# Patient Record
Sex: FEMALE | Race: BLACK OR AFRICAN AMERICAN | Employment: FULL TIME | URBAN - METROPOLITAN AREA
[De-identification: names, ages, dates, MRNs, and addresses within clinical notes are randomized per-mention and may not be internally consistent; named-entity substitution may affect disease eponyms.]

---

## 2018-06-06 LAB
HIV, EXTERNAL: NON REACTIVE
TYPE, ABO & RH, EXTERNAL: NORMAL

## 2018-07-31 LAB
CHLAMYDIA, EXTERNAL: NEGATIVE
HBSAG, EXTERNAL: NEGATIVE
N. GONORRHEA, EXTERNAL: NEGATIVE
RUBELLA, EXTERNAL: NORMAL
T. PALLIDUM, EXTERNAL: NEGATIVE

## 2018-11-13 ENCOUNTER — HOSPITAL ENCOUNTER (EMERGENCY)
Age: 38
Discharge: HOME | End: 2018-11-13
Attending: OBSTETRICS & GYNECOLOGY | Admitting: OBSTETRICS & GYNECOLOGY
Payer: COMMERCIAL

## 2018-11-13 VITALS
WEIGHT: 140 LBS | HEART RATE: 67 BPM | SYSTOLIC BLOOD PRESSURE: 105 MMHG | RESPIRATION RATE: 14 BRPM | DIASTOLIC BLOOD PRESSURE: 66 MMHG | TEMPERATURE: 99.4 F

## 2018-11-13 PROCEDURE — 99282 EMERGENCY DEPT VISIT SF MDM: CPT

## 2018-11-13 RX ORDER — LEVOTHYROXINE SODIUM 125 UG/1
125 TABLET ORAL
COMMUNITY
End: 2019-02-19

## 2018-11-13 NOTE — DISCHARGE INSTRUCTIONS
Pregnancy Precautions: Care Instructions  Your Care Instructions    There is no sure way to prevent labor before your due date ( labor) or to prevent most other pregnancy problems. But there are things you can do to increase your chances of a healthy pregnancy. Go to your appointments, follow your doctor's advice, and take good care of yourself. Eat well, and exercise (if your doctor agrees). And make sure to drink plenty of water. Follow-up care is a key part of your treatment and safety. Be sure to make and go to all appointments, and call your doctor if you are having problems. It's also a good idea to know your test results and keep a list of the medicines you take. How can you care for yourself at home? · Make sure you go to your prenatal appointments. At each visit, your doctor will check your blood pressure. Your doctor will also check to see if you have protein in your urine. High blood pressure and protein in urine are signs of preeclampsia. This condition can be dangerous for you and your baby. · Drink plenty of fluids, enough so that your urine is light yellow or clear like water. Dehydration can cause contractions. If you have kidney, heart, or liver disease and have to limit fluids, talk with your doctor before you increase the amount of fluids you drink. · Tell your doctor right away if you notice any symptoms of an infection, such as:  ? Burning when you urinate. ? A foul-smelling discharge from your vagina. ? Vaginal itching. ? Unexplained fever. ? Unusual pain or soreness in your uterus or lower belly. · Eat a balanced diet. Include plenty of foods that are high in calcium and iron. ? Foods high in calcium include milk, cheese, yogurt, almonds, and broccoli. ? Foods high in iron include red meat, shellfish, poultry, eggs, beans, raisins, whole-grain bread, and leafy green vegetables. · Do not smoke.  If you need help quitting, talk to your doctor about stop-smoking programs and medicines. These can increase your chances of quitting for good. · Do not drink alcohol or use illegal drugs. · Follow your doctor's directions about activity. Your doctor will let you know how much, if any, exercise you can do. · Ask your doctor if you can have sex. If you are at risk for early labor, your doctor may ask you to not have sex. · Take care to prevent falls. During pregnancy, your joints are loose, and your balance is off. Sports such as bicycling, skiing, or in-line skating can increase your risk of falling. And don't ride horses or motorcycles, dive, water ski, scuba dive, or parachute jump while you are pregnant. · Avoid getting very hot. Do not use saunas or hot tubs. Avoid staying out in the sun in hot weather for long periods. Take acetaminophen (Tylenol) to lower a high fever. · Do not take any over-the-counter or herbal medicines or supplements without talking to your doctor or pharmacist first.  When should you call for help? Call 911 anytime you think you may need emergency care. For example, call if:    · You passed out (lost consciousness).     · You have severe vaginal bleeding.     · You have severe pain in your belly or pelvis.     · You have had fluid gushing or leaking from your vagina and you know or think the umbilical cord is bulging into your vagina. If this happens, immediately get down on your knees so your rear end (buttocks) is higher than your head. This will decrease the pressure on the cord until help arrives.   [facility] your doctor now or seek immediate medical care if:    · You have signs of preeclampsia, such as:  ? Sudden swelling of your face, hands, or feet. ? New vision problems (such as dimness or blurring). ? A severe headache.     · You have any vaginal bleeding.     · You have belly pain or cramping.     · You have a fever.     · You have had regular contractions (with or without pain) for an hour.  This means that you have 8 or more within 1 hour or 4 or more in 20 minutes after you change your position and drink fluids.     · You have a sudden release of fluid from your vagina.     · You have low back pain or pelvic pressure that does not go away.     · You notice that your baby has stopped moving or is moving much less than normal.    Watch closely for changes in your health, and be sure to contact your doctor if you have any problems. Where can you learn more? Go to http://danna-ambreen.info/. Enter [id number] in the search box to learn more about \"Pregnancy Precautions: Care Instructions. \"  Current as of: November 21, 2017  Content Version: 11.8  © 2845-2886 [employer]. Care instructions adapted under license by [employer] (which disclaims liability or warranty for this information). If you have questions about a medical condition or this instruction, always ask your healthcare professional. [address] any warranty or liability for your use of this information.

## 2018-11-13 NOTE — H&P
Obstetrics Admission History & Physical 
 
Name: Kahlil Jeffery MRN: [id number]  SSN: [id number] YOB: 1980  Age: 41 y.o. Sex: female Subjective:  
 
Reason for Admission:  Pregnancy and syncope History of Present Illness: Kahlil Jeffery is a 41 y.o.  female with an estimated gestational age of Unknown with Estimated Date of Delivery: None noted. . Patient complains of becoming overheated, lightheaded and then fainting in a coworkers arms at work today. No abdominal trauma, no real loss of consciousness, roused quickly. They called EMS but at that time she was feeling better so she elected to go home to rest.  At home she again began feeling hot and dizzy so presented to the hospital.  Upon arrival to L&D she notes feeling much better. No residual lightheaded/dizzy/faintness. She reports poor PO hydration today but says she's had enough to eat, although her only current complaint is hunger and thirst. She is feeling good FM. Denies LOF, VB and contractions. Pregnancy has been complicated by advanced maternal age with normal tetra and fetal scan. She also has hypothyroidism which has been controlled on synthroid. OB history: [id number] Term  x 2 and EAB x 2 No past medical history on file. No past surgical history on file. Social History Socioeconomic History  Marital status: SINGLE Spouse name: Not on file  Number of children: Not on file  Years of education: Not on file  Highest education level: Not on file Social Needs  Financial resource strain: Not on file  Food insecurity - worry: Not on file  Food insecurity - inability: Not on file  Transportation needs - medical: Not on file  Transportation needs - non-medical: Not on file Occupational History  Not on file Tobacco Use  Smoking status: Not on file Substance and Sexual Activity  Alcohol use: Not on file  Drug use: Not on file  Sexual activity: Not on file Other Topics Concern  Not on file Social History Narrative  Not on file No family history on file. Allergies not on file Prior to Admission medications Not on File Review of Systems: A comprehensive review of systems was negative except for that written in the History of Present Illness. Objective:  
 
Vitals: There were no vitals taken for this visit. No data recorded. No Data Recorded Physical Exam: 
Patient without distress. Heart: Regular rate and rhythm Lung: normal respiratory effort Fundus: soft and non tender Lower Extremities:  - Edema No    
 
Membranes:  Intact Uterine Activity:  None Fetal Heart Rate:  Baseline: 145 per minute Variability: moderate No accels, no decels Uterine contractions: none Labs: No results found for this or any previous visit (from the past 24 hour(s)). Assessment and Plan: S/p syncope - suspect related to poor PO intake/hydration today, now feeling much better, reassuring FHTs 
- PO hydrate, snack - pt reassured 
- has appt in 2 weeks for thyroid function and 28wk labs  - advised ok to wait until then for bloodwork as long as she is doing well in the interim, if persistent syncope/near syncope then would need to do CBC and TFTs sooner, pt in agreement with plan Signed By:  Ulysses Stock MD   
 November 13, 2018

## 2019-01-22 LAB — GRBS, EXTERNAL: NEGATIVE

## 2019-02-17 ENCOUNTER — HOSPITAL ENCOUNTER (INPATIENT)
Age: 39
LOS: 2 days | Discharge: HOME | End: 2019-02-19
Attending: OBSTETRICS & GYNECOLOGY | Admitting: ADVANCED PRACTICE MIDWIFE
Payer: SELF-PAY

## 2019-02-17 ENCOUNTER — ANESTHESIA EVENT (OUTPATIENT)
Dept: LABOR AND DELIVERY | Age: 39
End: 2019-02-17
Payer: SELF-PAY

## 2019-02-17 ENCOUNTER — ANESTHESIA (OUTPATIENT)
Dept: LABOR AND DELIVERY | Age: 39
End: 2019-02-17
Payer: SELF-PAY

## 2019-02-17 PROBLEM — Z34.90 PREGNANCY: Status: ACTIVE | Noted: 2019-02-17

## 2019-02-17 LAB
BASOPHILS # BLD: 0 K/UL (ref 0–0.1)
BASOPHILS NFR BLD: 0 % (ref 0–1)
DIFFERENTIAL METHOD BLD: ABNORMAL
EOSINOPHIL # BLD: 0.1 K/UL (ref 0–0.4)
EOSINOPHIL NFR BLD: 1 % (ref 0–7)
ERYTHROCYTE [DISTWIDTH] IN BLOOD BY AUTOMATED COUNT: 17.5 % (ref 11.5–14.5)
HCT VFR BLD AUTO: 28.4 % (ref 35–47)
HGB BLD-MCNC: 8.7 G/DL (ref 11.5–16)
IMM GRANULOCYTES # BLD AUTO: 0.1 K/UL (ref 0–0.04)
IMM GRANULOCYTES NFR BLD AUTO: 1 % (ref 0–0.5)
LYMPHOCYTES # BLD: 2.2 K/UL (ref 0.8–3.5)
LYMPHOCYTES NFR BLD: 19 % (ref 12–49)
MCH RBC QN AUTO: 22.7 PG (ref 26–34)
MCHC RBC AUTO-ENTMCNC: 30.6 G/DL (ref 30–36.5)
MCV RBC AUTO: 74 FL (ref 80–99)
MONOCYTES # BLD: 1.4 K/UL (ref 0–1)
MONOCYTES NFR BLD: 12 % (ref 5–13)
NEUTS SEG # BLD: 7.7 K/UL (ref 1.8–8)
NEUTS SEG NFR BLD: 67 % (ref 32–75)
NRBC # BLD: 0.25 K/UL (ref 0–0.01)
NRBC BLD-RTO: 2.2 PER 100 WBC
PLATELET # BLD AUTO: 233 K/UL (ref 150–400)
PMV BLD AUTO: 11.8 FL (ref 8.9–12.9)
RBC # BLD AUTO: 3.84 M/UL (ref 3.8–5.2)
WBC # BLD AUTO: 11.4 K/UL (ref 3.6–11)

## 2019-02-17 PROCEDURE — 74011250637 HC RX REV CODE- 250/637: Performed by: ADVANCED PRACTICE MIDWIFE

## 2019-02-17 PROCEDURE — 65410000002 HC RM PRIVATE OB

## 2019-02-17 PROCEDURE — 74011250636 HC RX REV CODE- 250/636: Performed by: ANESTHESIOLOGY

## 2019-02-17 PROCEDURE — A4300 CATH IMPL VASC ACCESS PORTAL: HCPCS

## 2019-02-17 PROCEDURE — 36415 COLL VENOUS BLD VENIPUNCTURE: CPT

## 2019-02-17 PROCEDURE — 65270000029 HC RM PRIVATE

## 2019-02-17 PROCEDURE — 74011000250 HC RX REV CODE- 250

## 2019-02-17 PROCEDURE — 75410000002 HC LABOR FEE PER 1 HR

## 2019-02-17 PROCEDURE — 74011000250 HC RX REV CODE- 250: Performed by: ANESTHESIOLOGY

## 2019-02-17 PROCEDURE — 74011250636 HC RX REV CODE- 250/636: Performed by: ADVANCED PRACTICE MIDWIFE

## 2019-02-17 PROCEDURE — 85025 COMPLETE CBC W/AUTO DIFF WBC: CPT

## 2019-02-17 PROCEDURE — 76060000078 HC EPIDURAL ANESTHESIA

## 2019-02-17 PROCEDURE — 75410000000 HC DELIVERY VAGINAL/SINGLE

## 2019-02-17 PROCEDURE — 00HU33Z INSERTION OF INFUSION DEVICE INTO SPINAL CANAL, PERCUTANEOUS APPROACH: ICD-10-PCS | Performed by: ANESTHESIOLOGY

## 2019-02-17 PROCEDURE — 77030011943

## 2019-02-17 RX ORDER — SWAB
1 SWAB, NON-MEDICATED MISCELLANEOUS DAILY
Status: DISCONTINUED | OUTPATIENT
Start: 2019-02-18 | End: 2019-02-19 | Stop reason: HOSPADM

## 2019-02-17 RX ORDER — BUPIVACAINE HYDROCHLORIDE 2.5 MG/ML
INJECTION, SOLUTION EPIDURAL; INFILTRATION; INTRACAUDAL AS NEEDED
Status: DISCONTINUED | OUTPATIENT
Start: 2019-02-17 | End: 2019-02-17 | Stop reason: HOSPADM

## 2019-02-17 RX ORDER — FENTANYL CITRATE 50 UG/ML
100 INJECTION, SOLUTION INTRAMUSCULAR; INTRAVENOUS ONCE
Status: DISCONTINUED | OUTPATIENT
Start: 2019-02-17 | End: 2019-02-18 | Stop reason: HOSPADM

## 2019-02-17 RX ORDER — SIMETHICONE 80 MG
80 TABLET,CHEWABLE ORAL
Status: DISCONTINUED | OUTPATIENT
Start: 2019-02-17 | End: 2019-02-19 | Stop reason: HOSPADM

## 2019-02-17 RX ORDER — OXYTOCIN/RINGER'S LACTATE 20/1000 ML
125-500 PLASTIC BAG, INJECTION (ML) INTRAVENOUS ONCE
Status: ACTIVE | OUTPATIENT
Start: 2019-02-18 | End: 2019-02-18

## 2019-02-17 RX ORDER — NALBUPHINE HYDROCHLORIDE 10 MG/ML
2.5 INJECTION, SOLUTION INTRAMUSCULAR; INTRAVENOUS; SUBCUTANEOUS
Status: ACTIVE | OUTPATIENT
Start: 2019-02-17 | End: 2019-02-17

## 2019-02-17 RX ORDER — FENTANYL/BUPIVACAINE/NS/PF 2-1250MCG
1-16 PREFILLED PUMP RESERVOIR EPIDURAL CONTINUOUS
Status: DISCONTINUED | OUTPATIENT
Start: 2019-02-17 | End: 2019-02-19 | Stop reason: HOSPADM

## 2019-02-17 RX ORDER — NALOXONE HYDROCHLORIDE 0.4 MG/ML
0.4 INJECTION, SOLUTION INTRAMUSCULAR; INTRAVENOUS; SUBCUTANEOUS AS NEEDED
Status: DISCONTINUED | OUTPATIENT
Start: 2019-02-17 | End: 2019-02-18 | Stop reason: HOSPADM

## 2019-02-17 RX ORDER — DIPHENHYDRAMINE HYDROCHLORIDE 50 MG/ML
INJECTION, SOLUTION INTRAMUSCULAR; INTRAVENOUS
Status: DISPENSED
Start: 2019-02-17 | End: 2019-02-18

## 2019-02-17 RX ORDER — FENTANYL CITRATE 50 UG/ML
INJECTION, SOLUTION INTRAMUSCULAR; INTRAVENOUS AS NEEDED
Status: DISCONTINUED | OUTPATIENT
Start: 2019-02-17 | End: 2019-02-17 | Stop reason: HOSPADM

## 2019-02-17 RX ORDER — BUPIVACAINE HYDROCHLORIDE 2.5 MG/ML
15 INJECTION, SOLUTION EPIDURAL; INFILTRATION; INTRACAUDAL ONCE
Status: ACTIVE | OUTPATIENT
Start: 2019-02-17 | End: 2019-02-18

## 2019-02-17 RX ORDER — SODIUM CHLORIDE 0.9 % (FLUSH) 0.9 %
5-40 SYRINGE (ML) INJECTION AS NEEDED
Status: DISCONTINUED | OUTPATIENT
Start: 2019-02-17 | End: 2019-02-18 | Stop reason: HOSPADM

## 2019-02-17 RX ORDER — SODIUM CHLORIDE, SODIUM LACTATE, POTASSIUM CHLORIDE, CALCIUM CHLORIDE 600; 310; 30; 20 MG/100ML; MG/100ML; MG/100ML; MG/100ML
125 INJECTION, SOLUTION INTRAVENOUS CONTINUOUS
Status: DISCONTINUED | OUTPATIENT
Start: 2019-02-17 | End: 2019-02-18 | Stop reason: HOSPADM

## 2019-02-17 RX ORDER — DIPHENHYDRAMINE HYDROCHLORIDE 50 MG/ML
25 INJECTION, SOLUTION INTRAMUSCULAR; INTRAVENOUS ONCE
Status: COMPLETED | OUTPATIENT
Start: 2019-02-17 | End: 2019-02-17

## 2019-02-17 RX ORDER — HYDROCODONE BITARTRATE AND ACETAMINOPHEN 5; 325 MG/1; MG/1
1 TABLET ORAL
Status: DISCONTINUED | OUTPATIENT
Start: 2019-02-17 | End: 2019-02-19 | Stop reason: HOSPADM

## 2019-02-17 RX ORDER — LANOLIN ALCOHOL/MO/W.PET/CERES
1 CREAM (GRAM) TOPICAL
Status: DISCONTINUED | OUTPATIENT
Start: 2019-02-18 | End: 2019-02-19 | Stop reason: HOSPADM

## 2019-02-17 RX ORDER — MAG HYDROX/ALUMINUM HYD/SIMETH 200-200-20
30 SUSPENSION, ORAL (FINAL DOSE FORM) ORAL
Status: DISCONTINUED | OUTPATIENT
Start: 2019-02-17 | End: 2019-02-18 | Stop reason: HOSPADM

## 2019-02-17 RX ORDER — IBUPROFEN 400 MG/1
800 TABLET ORAL EVERY 8 HOURS
Status: DISCONTINUED | OUTPATIENT
Start: 2019-02-18 | End: 2019-02-19 | Stop reason: HOSPADM

## 2019-02-17 RX ORDER — EPHEDRINE SULFATE 50 MG/ML
INJECTION, SOLUTION INTRAVENOUS
Status: COMPLETED
Start: 2019-02-17 | End: 2019-02-17

## 2019-02-17 RX ORDER — SODIUM CHLORIDE 0.9 % (FLUSH) 0.9 %
5-40 SYRINGE (ML) INJECTION EVERY 8 HOURS
Status: DISCONTINUED | OUTPATIENT
Start: 2019-02-17 | End: 2019-02-18 | Stop reason: HOSPADM

## 2019-02-17 RX ORDER — ACETAMINOPHEN 325 MG/1
650 TABLET ORAL
Status: DISCONTINUED | OUTPATIENT
Start: 2019-02-17 | End: 2019-02-19 | Stop reason: HOSPADM

## 2019-02-17 RX ORDER — BUPIVACAINE HYDROCHLORIDE 2.5 MG/ML
15 INJECTION, SOLUTION EPIDURAL; INFILTRATION; INTRACAUDAL ONCE
Status: DISPENSED | OUTPATIENT
Start: 2019-02-17 | End: 2019-02-18

## 2019-02-17 RX ORDER — EPHEDRINE SULFATE 50 MG/ML
12.5 INJECTION, SOLUTION INTRAVENOUS ONCE
Status: COMPLETED | OUTPATIENT
Start: 2019-02-17 | End: 2019-02-17

## 2019-02-17 RX ORDER — OXYTOCIN/0.9 % SODIUM CHLORIDE 30/500 ML
0-25 PLASTIC BAG, INJECTION (ML) INTRAVENOUS
Status: DISCONTINUED | OUTPATIENT
Start: 2019-02-17 | End: 2019-02-18 | Stop reason: HOSPADM

## 2019-02-17 RX ORDER — MISOPROSTOL 200 UG/1
TABLET ORAL
Status: DISCONTINUED
Start: 2019-02-17 | End: 2019-02-18 | Stop reason: WASHOUT

## 2019-02-17 RX ORDER — HYDROCORTISONE ACETATE PRAMOXINE HCL 2.5; 1 G/100G; G/100G
CREAM TOPICAL AS NEEDED
Status: DISCONTINUED | OUTPATIENT
Start: 2019-02-17 | End: 2019-02-19 | Stop reason: HOSPADM

## 2019-02-17 RX ORDER — NALBUPHINE HYDROCHLORIDE 10 MG/ML
2.5 INJECTION, SOLUTION INTRAMUSCULAR; INTRAVENOUS; SUBCUTANEOUS
Status: DISCONTINUED | OUTPATIENT
Start: 2019-02-17 | End: 2019-02-18 | Stop reason: HOSPADM

## 2019-02-17 RX ORDER — LEVOTHYROXINE SODIUM 150 UG/1
150 TABLET ORAL
Status: DISCONTINUED | OUTPATIENT
Start: 2019-02-17 | End: 2019-02-19 | Stop reason: HOSPADM

## 2019-02-17 RX ORDER — NALOXONE HYDROCHLORIDE 0.4 MG/ML
0.4 INJECTION, SOLUTION INTRAMUSCULAR; INTRAVENOUS; SUBCUTANEOUS AS NEEDED
Status: DISCONTINUED | OUTPATIENT
Start: 2019-02-17 | End: 2019-02-19 | Stop reason: HOSPADM

## 2019-02-17 RX ADMIN — BUPIVACAINE HYDROCHLORIDE 5 ML: 2.5 INJECTION, SOLUTION EPIDURAL; INFILTRATION; INTRACAUDAL at 16:50

## 2019-02-17 RX ADMIN — OXYTOCIN-SODIUM CHLORIDE 0.9% IV SOLN 30 UNIT/500ML 3 MILLI-UNITS/MIN: 30-0.9/5 SOLUTION at 16:50

## 2019-02-17 RX ADMIN — Medication 10 ML/HR: at 17:04

## 2019-02-17 RX ADMIN — OXYTOCIN-SODIUM CHLORIDE 0.9% IV SOLN 30 UNIT/500ML 2 MILLI-UNITS/MIN: 30-0.9/5 SOLUTION at 21:23

## 2019-02-17 RX ADMIN — LEVOTHYROXINE SODIUM 150 MCG: 50 TABLET ORAL at 10:41

## 2019-02-17 RX ADMIN — OXYTOCIN-SODIUM CHLORIDE 0.9% IV SOLN 30 UNIT/500ML 2 MILLI-UNITS/MIN: 30-0.9/5 SOLUTION at 18:01

## 2019-02-17 RX ADMIN — DIPHENHYDRAMINE HYDROCHLORIDE 25 MG: 50 INJECTION, SOLUTION INTRAMUSCULAR; INTRAVENOUS at 21:58

## 2019-02-17 RX ADMIN — SODIUM CHLORIDE, SODIUM LACTATE, POTASSIUM CHLORIDE, AND CALCIUM CHLORIDE 999 ML/HR: 600; 310; 30; 20 INJECTION, SOLUTION INTRAVENOUS at 10:11

## 2019-02-17 RX ADMIN — EPHEDRINE SULFATE 12.5 MG: 50 INJECTION, SOLUTION INTRAVENOUS at 17:08

## 2019-02-17 RX ADMIN — BUPIVACAINE HYDROCHLORIDE 2 ML: 2.5 INJECTION, SOLUTION EPIDURAL; INFILTRATION; INTRACAUDAL at 16:41

## 2019-02-17 RX ADMIN — BUPIVACAINE HYDROCHLORIDE 5 ML: 2.5 INJECTION, SOLUTION EPIDURAL; INFILTRATION; INTRACAUDAL at 16:45

## 2019-02-17 RX ADMIN — FENTANYL CITRATE 100 MCG: 50 INJECTION, SOLUTION INTRAMUSCULAR; INTRAVENOUS at 16:48

## 2019-02-17 RX ADMIN — OXYTOCIN-SODIUM CHLORIDE 0.9% IV SOLN 30 UNIT/500ML 2 MILLI-UNITS/MIN: 30-0.9/5 SOLUTION at 13:55

## 2019-02-17 RX ADMIN — OXYTOCIN-SODIUM CHLORIDE 0.9% IV SOLN 30 UNIT/500ML 2 MILLI-UNITS/MIN: 30-0.9/5 SOLUTION at 23:34

## 2019-02-17 NOTE — ANESTHESIA PROCEDURE NOTES
Epidural Block    Performed by: Nazario Aguirre MD  Authorized by: Nazario Aguirre MD     Pre-Procedure  Indication: labor epidural    Preanesthetic Checklist: patient identified, risks and benefits discussed, anesthesia consent, site marked, patient being monitored, timeout performed and anesthesia consent      Assessment:   Labor Epidural catheter placement Procedure Note    Epidural analgesia requested. All questions answered regarding epidural placement. Patient wishes to proceed. \"Time-out\" performed  Patient was placed in the seated position. The back was prepped and draped at the lumbar region. Lidocaine 1% x 2ml was injected locally at ~L3 - L4. A 17G Tuohy needle was placed at the above level. Loss of resistance was obtained, no CSF or paresthesia was apparent. A 19 G epidural catheter was placed and secured at ~7 cm at the skin. Aspiration was negative. No paresthesias noted. A test dose of local anesthetic as charted  was negative for heart rate change, tinnitus, metallic taste or mental status changes. Catheter was secured with Tegaderm and tape. Constant infusion w/ patient controlled bolus as ordered was started by RN. No apparent complications. Vital signs were stable and the patient reported relief.

## 2019-02-17 NOTE — H&P
History and Physical 
 
Patient: Nasrin Zamora MRN: [id number]  SSN: [id number] YOB: 1980  Age: 41 y.o. Sex: female Subjective:  
  
Nasrin Zamora is a 41 y.o. female [id number] at 39w4d with an ENEIDA of 2/20/19. Presents to labor and delivery for SROM at 0830 this morning. Reports she was sitting up in bed and experienced a large gush of fluid and has continued to have leaking and gushes of fluid since. Reports fluid is clear and odorless. Endorses good fetal movement, reports some ctx are mild, but becoming stronger. Denies VB. Endorses good fetal movement. Pregnancy has been complicated by hypothyroidism, is on 150 mcg of synthroid daily, but with poor compliance. Has been having weekly fetal surveillance, last completed last week, EFW 7lb 11oz, 58%, CAMERON 17cm. Has not taken dose today. Client AMA, normal tetra and fetal scan. Pregnancy complicated by polyhydramnios, last CAMERON last week 17cm. Client anemic- on supplementation. FOB with Sickle S Trait, genetic counseling and further workup declined until postpartum. Client with 2 previous vaginal deliveries 20 and 11 years ago. Two TAB, one in 2008 and 2009. Past Medical History:  
Diagnosis Date  Abnormal Papanicolaou smear of cervix   
 about 3 years; f/u WNL  Sickle cell trait syndrome ([facility] [address]  Thyroid activity decreased   
 takes synthroid No past surgical history on file. Family History Problem Relation Age of Onset  Diabetes Father  Cancer Maternal Aunt Social History Tobacco Use  Smoking status: Not on file Substance Use Topics  Alcohol use: No  
  Frequency: Never Prior to Admission medications Medication Sig Start Date End Date Taking? Authorizing Provider  
levothyroxine (SYNTHROID) 125 mcg tablet Take 125 mcg by mouth Daily (before breakfast). Provider, Historical  
PNV66-Iron Fumarate-FA-DSS-DHA 26-1.2- mg cap Take  by mouth.     Provider, Historical  
  
 
 No Known Allergies Review of Systems: A comprehensive review of systems was negative except for that written in the History of Present Illness. Objective:  
 
Vitals:  
 02/17/19 4829 BP: 115/76 Pulse: 66 Resp: 16 Temp: 98.4 °F (36.9 °C) Physical Exam: 
GENERAL: alert, cooperative, no distress, appears stated age LUNG: clear to auscultation bilaterally HEART: regular rate and rhythm, S1, S2 normal, no murmur, click, rub or gallop ABDOMEN: soft, non-tender. Bowel sounds normal. No masses,  no organomegaly, gravid EXTREMITIES:  extremities normal, atraumatic, no cyanosis or edema SKIN: Normal. 
NEUROLOGIC: negative PSYCHIATRIC: WNL 
 
SVE: 3/50/-3, grossly ruptured, vertex NST: Monitored for 20 minutes, reassuring, baseline: 125, positive accels, variable deceleration noted, mild early deceleration noted, ctx q 2 to 8 minutes, moderate to palpation. Assessment:  
 
Hospital Problems  Never Reviewed Codes Class Noted POA Pregnancy ICD-10-CM: Z34.90 ICD-9-CM: V22.2  2/17/2019 Unknown SROM at term O positive GBS Negative Rubella Immune Hep B negative- confirmed in prenatal  
 
Plan:  
 
Admit for SROM. Discussed options of expectant management for 8 to 12 hours, or starting pitocin to facilitate labor. Client desires expectant management at this time, but if not in active labor in a few hours may desire pitocin at that time. IV, blood sample and CBC ordered. Synthroid 150mcg po every day in am ordered. Encouraged ambulation and position changes to facilitate labor. Discussed birthing ball and squatting. Maternal and fetal monitoring per protocol. Anticipate vaginal delivery. Signed By: Chelsey Bran CNM February 17, 2019

## 2019-02-17 NOTE — PROGRESS NOTES
[facility] Labor Progress Note Patient: Dai Dill MRN: [id number]  SSN: [id number] YOB: 1980  Age: 41 y.o. Sex: female Subjective:  
Patient coping well with contractions. Reports they remain very mild despite walking. Would like to start pitocin at this time. Objective:  
Blood pressure 115/76, pulse 66, temperature 98.4 °F (36.9 °C), resp. rate 16, height 5' 2\" (1.575 m), weight 66.2 kg (146 lb), not currently breastfeeding. Fetal heart baseline 120,  Moderate to palpation, resting tone soft, Uterine contractions irregular, mild to palpation, resting tone soft, Sterile Vaginal Exam: deferred, fetal presentation vertex, membranes ruptured for continued clear fluid. Patient Vitals for the past 4 hrs: 
 Temp Pulse Resp BP  
19 0942 98.4 °F (36.9 °C) 66 16 115/76 Assessment:  
 
39w4d Category 1 fetal heart rate tracing PROM x 5 hours Plan:  
Pitocin order placed. Titrate to adequacy. Recheck cervix 4 to 6 hours after adequate pattern achieved, sooner with maternal or fetal indication. Anticipate SHADE Phipps CNM

## 2019-02-17 NOTE — PROGRESS NOTES
1015- Bedside and Verbal shift change report given to NARESH Cortez RN (oncoming nurse) by D. Seaver, RN (offgoing nurse). Report included the following information SBAR, MAR and Recent Results.

## 2019-02-17 NOTE — PROGRESS NOTES
1540- Bedside shift change report given to Orlando Ayala RN (oncoming nurse) by Karla Butts RN (offgoing nurse). Report included the following information SBAR, Procedure Summary and Intake/Output. Assumed care of patient at this time. Pt resting in bed, breathing through contractions. Family at bedside. 1635- Pt ambulatory to bathroom, nurse at bedside. [address] placed by Brown Roles CNM 
2152- [place] Roles CNM at bedside. SVE 9cm 26- Birth of live male infant.

## 2019-02-17 NOTE — PROGRESS NOTES
[facility] Labor Progress Note Patient: Luz Marina Gustafson MRN: [id number]  SSN: [id number] YOB: 1980  Age: 41 y.o. Sex: female Subjective:  
Patient coping well with contractions. Is now comfortable with epidural.  
 
 
Objective:  
Blood pressure 101/70, pulse 79, temperature 98.4 °F (36.9 °C), resp. rate 18, height 5' 2\" (1.575 m), weight 66.2 kg (146 lb), not currently breastfeeding. Fetal heart baseline 135, moderate variability, positive accelerations, positive variable decelerations, Uterine contractions q 2-4 minutes,  strong to palpation, resting tone soft, Sterile Vaginal Exam: 7 cm dilated/ 100 % effaced/ -2 station, cervix anterior, fetal presentation vertex, membranes ruptured for continued clear fluid. Pitocin off due to decelerations Patient Vitals for the past 4 hrs: 
 Temp Pulse Resp BP  
19 1529 98.4 °F (36.9 °C) 79 18 101/70  
19 1453 98.3 °F (36.8 °C) 71 14 110/69 Assessment:  
 
 
39w4d Category 2 fetal heart rate tracing IOL for PROM 
PROM x 9 hours, no s/s of infection Plan:  
Rapid cervical change noted, anticipate second stage soon. Anticipate  Cassidy Guerrier CNM

## 2019-02-17 NOTE — PROGRESS NOTES
[facility] Labor Progress Note Patient: Emmanuelle Campuzano MRN: [id number]  SSN: [id number] YOB: 1980  Age: 41 y.o. Sex: female Subjective:  
Patient breathing through contractions, requesting epidural. Is lying on side in bed, breathing through contractions. Family present providing support. Objective:  
Blood pressure 101/70, pulse 79, temperature 98.4 °F (36.9 °C), resp. rate 18, height 5' 2\" (1.575 m), weight 66.2 kg (146 lb), not currently breastfeeding. Fetal heart baseline 130, moderate variability, positive accelerations, positive early decelerations, Uterine contractions q 2-4 minutes, strong to palpation, resting tone soft, Sterile Vaginal Exam: deferred, fetal presentation vertex, membranes ruptured for continued clear fluid. Pitocin at  6 mu/min Patient Vitals for the past 4 hrs: 
 Temp Pulse Resp BP  
19 1529 98.4 °F (36.9 °C) 79 18 101/70  
19 1453 98.3 °F (36.8 °C) 71 14 110/69 Assessment:  
 
 
39w4d Category 1 fetal heart rate tracing IOL for SROM 
SROM x 8 hours, no s/s of infection Plan:  
Bolus started for epidural. 
Once client comfortable will check cervix. Anticipate SHADE Bertrand CNM

## 2019-02-17 NOTE — PROGRESS NOTES
9892 Patient received via wheel chair from the ED for evaluation under services of Dr. Eve Herrera. Kari Hicks CNM on call. Patient states she had a large gush of clear fluid from her vagina at 0830 today. Also aware of intermittent abdominal cramping but denies discomfort. Denies vaginal bleeding. 8451 External fetal monitor applied 0930    MITCHELL Holloway Patience into see patient 0934 SVE by MITCHELL De La Rosa 3/50/-3, Gross SRM  clear fluid noted, discussing plan of care 1010 IV inserted # 20 g BD to LFA, routine labs drawn, Lactated Ringers fluid bolus started 1015 Bedside and Verbal shift change report given to Eduardo Hale RN  by [facility] [facility]. Report included the following information SBAR.

## 2019-02-17 NOTE — ANESTHESIA PREPROCEDURE EVALUATION
Anesthetic History   No history of anesthetic complications            Review of Systems / Medical History  Patient summary reviewed, nursing notes reviewed and pertinent labs reviewed    Pulmonary  Within defined limits                 Neuro/Psych   Within defined limits           Cardiovascular  Within defined limits                  Comments: SS trait   GI/Hepatic/Renal  Within defined limits              Endo/Other  Within defined limits    Hypothyroidism       Other Findings              Physical Exam    Airway  Mallampati: II  TM Distance: > 6 cm  Neck ROM: normal range of motion   Mouth opening: Normal     Cardiovascular  Regular rate and rhythm,  S1 and S2 normal,  no murmur, click, rub, or gallop             Dental  No notable dental hx       Pulmonary  Breath sounds clear to auscultation               Abdominal  GI exam deferred       Other Findings            Anesthetic Plan    ASA: 2  Anesthesia type: epidural          Induction: Intravenous  Anesthetic plan and risks discussed with: Patient

## 2019-02-18 PROCEDURE — 77030005537 HC CATH URETH BARD -A

## 2019-02-18 PROCEDURE — 74011250637 HC RX REV CODE- 250/637: Performed by: ADVANCED PRACTICE MIDWIFE

## 2019-02-18 PROCEDURE — 65410000002 HC RM PRIVATE OB

## 2019-02-18 PROCEDURE — 75410000003 HC RECOV DEL/VAG/CSECN EA 0.5 HR

## 2019-02-18 RX ADMIN — FERROUS SULFATE TAB 325 MG (65 MG ELEMENTAL FE) 325 MG: 325 (65 FE) TAB at 08:00

## 2019-02-18 RX ADMIN — Medication 1 TABLET: at 09:00

## 2019-02-18 RX ADMIN — IBUPROFEN 800 MG: 400 TABLET ORAL at 17:27

## 2019-02-18 RX ADMIN — IBUPROFEN 800 MG: 400 TABLET ORAL at 00:41

## 2019-02-18 RX ADMIN — HYDROCODONE BITARTRATE AND ACETAMINOPHEN 1 TABLET: 5; 325 TABLET ORAL at 14:06

## 2019-02-18 RX ADMIN — IBUPROFEN 800 MG: 400 TABLET ORAL at 09:45

## 2019-02-18 RX ADMIN — HYDROCODONE BITARTRATE AND ACETAMINOPHEN 1 TABLET: 5; 325 TABLET ORAL at 04:53

## 2019-02-18 RX ADMIN — LEVOTHYROXINE SODIUM 150 MCG: 50 TABLET ORAL at 08:47

## 2019-02-18 RX ADMIN — Medication 10 ML: at 01:38

## 2019-02-18 NOTE — PROGRESS NOTES
TRANSFER - IN REPORT: 
 
Verbal report received from [address] [place] [address] RN(name) on [employer]  being received from labor and delivery(unit) for routine progression of care Report consisted of patients Situation, Background, Assessment and  
Recommendations(SBAR). Information from the following report(s) SBAR, Procedure Summary, Intake/Output, MAR and Recent Results was reviewed with the receiving nurse. Opportunity for questions and clarification was provided. Assessment completed upon patients arrival to unit and care assumed.

## 2019-02-18 NOTE — ROUTINE PROCESS
1430: Bedside shift change report given to CIRILO El RN (oncoming nurse) by Honey Anthony RN (offgoing nurse). Report included the following information SBAR.

## 2019-02-18 NOTE — ROUTINE PROCESS
0800: Bedside and Verbal shift change report given to Antonio Lira RN  (oncoming nurse) by KELSIE Brady RN (offgoing nurse). Report included the following information SBAR.

## 2019-02-18 NOTE — LACTATION NOTE
This note was copied from a baby's chart. Initial Lactation Consultation - Baby born vaginally late last night to a  mom at  44 4/7 weeks gestation. Mom did not nurse her other 2 children. She has a history of hypothyroidism and is synthroid. She did notice breast changes during her pregnancy. Mom said baby has been sleepy since the circ and she has not been able to get him to nurse. I helped mom get the baby latched this afternoon. He latched quickly and began sucking rhythmically. Feeding Plan: Mother will keep baby skin to skin as often as possible, feed on demand, respond to feeding cues, obtain latch, listen for audible swallowing, be aware of signs of oxytocin release/ cramping, thirst and sleepiness while breastfeeding. Mom will not limit the time the baby is at the breast. She will allow the baby to completely finish one breast and then offer the second breast at each feeding.

## 2019-02-18 NOTE — PROGRESS NOTES
Post-Partum Day Number 1 Progress Note Chandni Dove Assessment: Doing well, post partum day 1 Chronic Anemia, hgb 8.7, asymptomatic Plan: 1. Continue routine postpartum and perineal care as well as maternal education. 2. The risks and benefits of the circumcision  procedure and anesthesia including: bleeding, infection, variability of cosmetic results were discussed at length with the mother. She is aware that future repeat procedures may be necessary. She gives informed consent to proceed as noted and her questions are answered. 3. Fe supplement on discharge Information for the patient's :  Isabel Agosto [[id number]] Vaginal, Spontaneous Patient doing well without significant complaint. Voiding without difficulty, normal lochia. Vitals: 
Visit Vitals /65 (BP 1 Location: Left arm, BP Patient Position: At rest) Pulse 65 Temp 98.5 °F (36.9 °C) Resp 16 Ht 5' 2\" (1.575 m) Wt 66.2 kg (146 lb) SpO2 100% Breastfeeding? Unknown BMI 26.70 kg/m² Temp (24hrs), Av.4 °F (36.9 °C), Min:98 °F (36.7 °C), Max:98.5 °F (36.9 °C) Exam:   Patient without distress. Abdomen soft, fundus firm, nontender Perineum with normal lochia noted. Lower extremities are negative for swelling, cords or tenderness. Labs:  
 
Lab Results Component Value Date/Time WBC 11.4 (H) 2019 10:26 AM  
 HGB 8.7 (L) 2019 10:26 AM  
 HCT 28.4 (L) 2019 10:26 AM  
 PLATELET 555 98/10/9313 10:26 AM  
 
 
Recent Results (from the past 24 hour(s)) CBC WITH AUTOMATED DIFF Collection Time: 19 10:26 AM  
Result Value Ref Range WBC 11.4 (H) 3.6 - 11.0 K/uL  
 RBC 3.84 3.80 - 5.20 M/uL HGB 8.7 (L) 11.5 - 16.0 g/dL HCT 28.4 (L) 35.0 - 47.0 % MCV 74.0 (L) 80.0 - 99.0 FL  
 MCH 22.7 (L) 26.0 - 34.0 PG  
 MCHC 30.6 30.0 - 36.5 g/dL  
 RDW 17.5 (H) 11.5 - 14.5 % PLATELET 266 060 - 825 K/uL MPV 11.8 8.9 - 12.9 FL  
 NRBC 2.2 (H) 0  WBC ABSOLUTE NRBC 0.25 (H) 0.00 - 0.01 K/uL NEUTROPHILS 67 32 - 75 % LYMPHOCYTES 19 12 - 49 % MONOCYTES 12 5 - 13 % EOSINOPHILS 1 0 - 7 % BASOPHILS 0 0 - 1 % IMMATURE GRANULOCYTES 1 (H) 0.0 - 0.5 % ABS. NEUTROPHILS 7.7 1.8 - 8.0 K/UL  
 ABS. LYMPHOCYTES 2.2 0.8 - 3.5 K/UL  
 ABS. MONOCYTES 1.4 (H) 0.0 - 1.0 K/UL  
 ABS. EOSINOPHILS 0.1 0.0 - 0.4 K/UL  
 ABS. BASOPHILS 0.0 0.0 - 0.1 K/UL  
 ABS. IMM. GRANS. 0.1 (H) 0.00 - 0.04 K/UL  
 DF AUTOMATED

## 2019-02-18 NOTE — PROGRESS NOTES
[facility] Labor Progress Note Patient: Francisca Doll MRN: [id number]  SSN: [id number] YOB: 1980  Age: 41 y.o. Sex: female Subjective:  
Patient coping well with contractions. Reports rectal pressure now. Objective:  
Blood pressure 112/67, pulse 79, temperature 98 °F (36.7 °C), resp. rate 18, height 5' 2\" (1.575 m), weight 66.2 kg (146 lb), SpO2 100 %, not currently breastfeeding. Fetal heart baseline 120, moderate variability, positive accelerations,  negative decelerations, Uterine contractions q 2-4 minutes,  strong to palpation, resting tone soft, Sterile Vaginal Exam: 9 cm dilated/ 90 % effaced/ -1 station, cervix anterior, fetal presentation vertex, membranes ruptured for continued clear fluid Pitocin at 2 mu/min Patient Vitals for the past 4 hrs: 
 Temp Pulse BP SpO2  
02/17/19 2007 98 °F (36.7 °C)   100 % 02/17/19 2002    100 % 02/17/19 1957    100 % 02/17/19 1952  79 112/67 100 % 02/17/19 1947    100 % 02/17/19 1942    100 % 02/17/19 1937    100 % 02/17/19 1932    100 % 02/17/19 1927    100 % 02/17/19 1922    100 % 02/17/19 1917    100 % 02/17/19 1912    100 % 02/17/19 1907    100 % 02/17/19 1902    100 % 02/17/19 1857    100 % 02/17/19 1852  95 98/60 100 % 02/17/19 1847    100 % 02/17/19 1842    100 % 02/17/19 1837    100 % 02/17/19 1832    100 % 02/17/19 1827    100 % 02/17/19 1822  96 99/57 100 % 02/17/19 1816    100 % 02/17/19 1811    100 % 02/17/19 1806    100 % 02/17/19 1801    100 % 02/17/19 1756    100 % 02/17/19 1755  68 93/51   
02/17/19 1751    100 % 02/17/19 1750  72 94/53   
02/17/19 1746    100 % 02/17/19 1745  68 99/54   
02/17/19 1741    100 % 02/17/19 1740  75 97/56   
02/17/19 1736    100 % 02/17/19 1735  71 106/55   
02/17/19 1731   95/52 100 % 02/17/19 1726    100 % 19 1725  71    
19 1721    100 % 19 1720  70 102/58   
19 1716  78 102/58 100 % 19 1712  88 108/63   
19 1711    100 % 19 1709  83 (!) 81/48   
19 1706    100 % 19 1705  84 (!) 85/53   
19 1702  82 (!) 87/50   
19 1701  80 (!) 88/54 100 % 19 1659  77 (!) 88/54   
19 1656  73 94/55 100 % 19 1653  73 94/51   
19 1651    100 % 19 1649  72 92/55   
19 1647  74 109/60   
19 1646    100 % 19 1641    100 % 19 1640  75 102/59  Assessment:  
 
 
39w4d Category 1 fetal heart rate tracing IOL for PROM- Progress noted PROM x 12 hours- No s/s of infection Plan:  
Continue current orders/management Anticipate  Rob Carr CNM

## 2019-02-18 NOTE — PROGRESS NOTES
[facility] Labor Progress Note Patient: Soumya Martinez MRN: [id number]  SSN: [id number] YOB: 1980  Age: 41 y.o. Sex: female Subjective:  
Patient coping well with contractions. Remains comfortable with epidural, but is feeling more pressure. Objective:  
Blood pressure 98/60, pulse 95, temperature 98.4 °F (36.9 °C), resp. rate 18, height 5' 2\" (1.575 m), weight 66.2 kg (146 lb), SpO2 100 %, not currently breastfeeding. Fetal heart baseline 115, moderate variability, positive accelerations,  negative decelerations, Uterine contractions q 2 minutes, strong to palpation, resting tone soft, Sterile Vaginal Exam: 8 cm dilated/ 90 % effaced/ -2 station, cervix anterior, fetal presentation vertex, membranes ruptured for continued clear fluid Pitocin at 2 mu/min Patient Vitals for the past 4 hrs: 
 Temp Pulse Resp BP SpO2  
02/17/19 1852  95  98/60 100 % 02/17/19 1847     100 % 02/17/19 1842     100 % 02/17/19 1837     100 % 02/17/19 1832     100 % 02/17/19 1827     100 % 02/17/19 1822  96  99/57 100 % 02/17/19 1816     100 % 02/17/19 1811     100 % 02/17/19 1806     100 % 02/17/19 1801     100 % 02/17/19 1756     100 % 02/17/19 1755  68  93/51   
02/17/19 1751     100 % 02/17/19 1750  72  94/53   
02/17/19 1746     100 % 02/17/19 1745  68  99/54   
02/17/19 1741     100 % 02/17/19 1740  75  97/56   
02/17/19 1736     100 % 02/17/19 1735  71  106/55   
02/17/19 1731    95/52 100 % 02/17/19 1726     100 % 02/17/19 1725  71     
02/17/19 1721     100 % 02/17/19 1720  70  102/58   
02/17/19 1716  78  102/58 100 % 02/17/19 1712  88  108/63   
02/17/19 1711     100 % 02/17/19 1709  83  (!) 81/48   
02/17/19 1706     100 % 02/17/19 1705  84  (!) 85/53   
02/17/19 1702  82  (!) 87/50   
02/17/19 1701  80  (!) 88/54 100 % 19 1659  77  (!) 88/54   
19 1656  73  94/55 100 % 19 1653  73  94/51   
19 1651     100 % 19 1649  72  92/55   
19 1647  74  109/60   
19 1646     100 % 19 1641     100 % 19 1640  75  102/59   
19 1631     99 % 19 1628  75  92/51   
19 1626     99 % 19 1623     94 % 19 1621     99 % 19 1618     94 % 19 1616     98 % 19 1529 98.4 °F (36.9 °C) 79 18 101/70  Assessment:  
 
 
39w4d Category 1 fetal heart rate tracing IOL for PROM 
PROM x 11 hours- No s/s of infection Plan:  
Continue current orders/management Continue pitocin Spinning babies Anticipate  Diego Maldonado CNM

## 2019-02-18 NOTE — PROGRESS NOTES
Pt up to bathroom, moves well without assistance but states her right leg still feels slightly numb. Patient instructed to continue to call for assistance when getting out of bed until numbness is gone. Pt assisted back to bed.

## 2019-02-18 NOTE — ANESTHESIA POSTPROCEDURE EVALUATION
* No procedures listed *. Anesthesia Post Evaluation        Patient location during evaluation: PACU  Patient participation: complete - patient participated  Level of consciousness: awake  Pain management: adequate  Airway patency: patent  Anesthetic complications: no  Cardiovascular status: hemodynamically stable  Respiratory status: acceptable  Hydration status: acceptable  Comments: I have seen and evaluated the patient. The patient is ready for PACU discharge. [address], DO                         Visit Vitals  /70 (BP 1 Location: Left arm, BP Patient Position: At rest)   Pulse 72   Temp 36.8 °C (98.2 °F)   Resp 16   Ht 5' 2\" (1.575 m)   Wt 66.2 kg (146 lb)   SpO2 100%   Breastfeeding?  Unknown   BMI 26.70 kg/m²

## 2019-02-18 NOTE — L&D DELIVERY NOTE
CNM Delivery Note       Patient: Hung Marie MRN: [id number]  SSN: [id number]    YOB: 1980  Age: 41 y.o. Sex: female        Complete cervical dilation at 2240.  of a live male infant at 2342 with Apgars 8, 9 in THEODORE position under epidural anesthesia with mother in stirrups position. Shoulders spontaneous, easily delivered with maternal effort. Vigorous infant placed on maternal abdomen immediately following delivery. Placenta and membranes spontaneous, intact, with 3 vessel cord via Shannan Annabelle mechanism at 2346. Cord noted around left upper extremity. Fundus massaged to firm. Estimated blood loss 200 mL. Vagina and perineum inspected. Perineum intact. Mother and infant stable, bonding, establishing breastfeeding. Delivery Summary    Patient: Hung Marie MRN: [id number]  SSN: [id number]    YOB: 1980  Age: 41 y.o. Sex: female       Information for the patient's :  Kadie Garcia [[id number]]       Labor Events:    Labor: No   Rupture Date: 2019   Rupture Time: 10:40 PM   Rupture Type SROM   Amniotic Fluid Volume:  Moderate    Amniotic Fluid Description: Clear     Induction: None       Augmentation: Oxytocin   Labor Events: None     Cervical Ripening:     None     Delivery Events:  Episiotomy: None   Laceration(s): None     Repaired: None    Number of Repair Packets:     Suture Type and Size: None     Estimated Blood Loss (ml): 200ml       Delivery Date: 2019    Delivery Time: 11:42 PM  Delivery Type: Vaginal, Spontaneous  Sex:  Male     Gestational Age: 43w3d   Delivery Clinician:  Malathi Carballo  Living Status: Living   Delivery Location: L&D L&D 9          APGARS  One minute Five minutes Ten minutes   Skin color: 0   1        Heart rate: 2   2        Grimace: 2   2        Muscle tone: 2   2        Breathin   2        Totals: 8   9            Presentation: Vertex    Position: Left Occiput Anterior  Resuscitation Method: Suctioning-bulb; Tactile Stimulation     Meconium Stained: None      Cord Information: 3 Vessels  Complications: Other(Comment) (Comment); Nuchal Cord Without Compressions  Cord around: left upper extremity  Delayed cord clamping? Yes  Cord clamped date/time:   Disposition of Cord Blood: Lab    Blood Gases Sent?: No    Placenta:  Date/Time: 2019 11:46 PM  Removal: Spontaneous      Appearance: Normal     [place] Measurements:  Birth Weight:        Birth Length:        Head Circumference:        Chest Circumference:       Abdominal Girth: Other Providers:   Nini Solorzano, Midwife;Primary Nurse;Primary  Nurse;Nicu Nurse;Neonatologist;Anesthesiologist;Crna;Nurse Practitioner;Midwife;Nursery Nurse           Group B Strep:   Lab Results   Component Value Date/Time    GrBStrep, External negative 2019     Information for the patient's :  Corrinne Shell [[id number]]   No results found for: ABORH, PCTABR, PCTDIG, BILI, ABORHEXT, ABORH    No results for input(s): PCO2CB, PO2CB, HCO3I, SO2I, IBD, PTEMPI, SPECTI, PHICB, ISITE, IDEV, IALLEN in the last 72 hours.

## 2019-02-18 NOTE — PROGRESS NOTES
2335: in room as baby care RN, Nilo Dutton RN remains primary nurse through delivery 2350: Bedside and Verbal shift change report given to MEGAN Hand RN (oncoming nurse) by Ulises Bee RN (offgoing nurse). Report included the following information SBAR, Intake/Output, MAR and Recent Results. 0155: TRANSFER - OUT REPORT: 
 
Verbal report given to NICHO Astudillo RN(name) on Chandni Chisholm  being transferred to MIU(unit) for routine progression of care Report consisted of patients Situation, Background, Assessment and  
Recommendations(SBAR). Information from the following report(s) SBAR, Intake/Output, MAR and Recent Results was reviewed with the receiving nurse. Lines:  
Peripheral IV 02/17/19 Anterior;Left;Proximal Forearm (Active) Site Assessment Clean, dry, & intact 2/17/2019 10:20 AM  
Phlebitis Assessment 0 2/17/2019 10:20 AM  
Infiltration Assessment 0 2/17/2019 10:20 AM  
Dressing Status Clean, dry, & intact 2/17/2019 10:20 AM  
Dressing Type Tape;Transparent 2/17/2019 10:20 AM  
Hub Color/Line Status Pink 2/17/2019 10:20 AM  
Action Taken Blood drawn 2/17/2019 10:20 AM  
Alcohol Cap Used No 2/17/2019 10:20 AM  
  
 
Opportunity for questions and clarification was provided. Patient transported with: 
 Registered Nurse

## 2019-02-19 VITALS
WEIGHT: 146 LBS | SYSTOLIC BLOOD PRESSURE: 105 MMHG | RESPIRATION RATE: 18 BRPM | OXYGEN SATURATION: 100 % | HEIGHT: 62 IN | HEART RATE: 73 BPM | DIASTOLIC BLOOD PRESSURE: 70 MMHG | BODY MASS INDEX: 26.87 KG/M2 | TEMPERATURE: 97.9 F

## 2019-02-19 PROBLEM — E03.8 OTHER SPECIFIED HYPOTHYROIDISM: Status: ACTIVE | Noted: 2019-02-19

## 2019-02-19 PROCEDURE — 74011250637 HC RX REV CODE- 250/637: Performed by: ADVANCED PRACTICE MIDWIFE

## 2019-02-19 RX ORDER — LEVOTHYROXINE SODIUM 150 UG/1
150 TABLET ORAL
Qty: 45 TAB | Refills: 0 | Status: SHIPPED | OUTPATIENT
Start: 2019-02-20 | End: 2019-02-19

## 2019-02-19 RX ORDER — OXYCODONE AND ACETAMINOPHEN 5; 325 MG/1; MG/1
1 TABLET ORAL
Qty: 12 TAB | Refills: 0 | Status: SHIPPED | OUTPATIENT
Start: 2019-02-19

## 2019-02-19 RX ORDER — CYANOCOBALAMIN (VITAMIN B-12) 1000 MCG
1 TABLET, EXTENDED RELEASE ORAL DAILY
Qty: 30 TAB | Refills: 3 | Status: SHIPPED | OUTPATIENT
Start: 2019-02-19

## 2019-02-19 RX ORDER — IBUPROFEN 600 MG/1
600 TABLET ORAL
Qty: 30 TAB | Refills: 1 | Status: SHIPPED | OUTPATIENT
Start: 2019-02-19

## 2019-02-19 RX ORDER — LEVOTHYROXINE SODIUM 150 UG/1
150 TABLET ORAL
Qty: 45 TAB | Refills: 0 | Status: SHIPPED | OUTPATIENT
Start: 2019-02-20

## 2019-02-19 RX ADMIN — FERROUS SULFATE TAB 325 MG (65 MG ELEMENTAL FE) 325 MG: 325 (65 FE) TAB at 08:26

## 2019-02-19 RX ADMIN — IBUPROFEN 800 MG: 400 TABLET ORAL at 02:57

## 2019-02-19 RX ADMIN — Medication 1 TABLET: at 08:26

## 2019-02-19 RX ADMIN — LEVOTHYROXINE SODIUM 150 MCG: 50 TABLET ORAL at 07:25

## 2019-02-19 RX ADMIN — IBUPROFEN 800 MG: 400 TABLET ORAL at 10:51

## 2019-02-19 NOTE — ROUTINE PROCESS
Bedside shift change report given to MITCHELL Martinez RN (oncoming nurse) by Robin El RN (offgoing nurse). Report included the following information SBAR, Kardex, Intake/Output, MAR and Recent Results.

## 2019-02-19 NOTE — DISCHARGE INSTRUCTIONS
Breastfeeding Support Group  [facility] Nursing Mothers Group meets the 1st and 3rd Tuesday of each month in the [facility] from 10:00-11:00, (located behind [employer] on the first floor) Meetings are facilitated by board certified lactation consultants and all breastfeeding moms and their infants are invited. POSTPARTUM DISCHARGE INSTRUCTIONS       Name:  Keiry Ojeda  YOB: 1980  Admission Diagnosis:  Pregnancy [Z34.90]     Discharge Diagnosis:    Problem List as of 2/19/2019 Never Reviewed          Codes Class Noted - Resolved    Other specified hypothyroidism ICD-10-CM: E03.8  ICD-9-CM: 244.8  2/19/2019 - Present        Pregnancy ICD-10-CM: Z34.90  ICD-9-CM: V22.2  2/17/2019 - Present            Attending Physician:  Chaz Ramirez MD    Delivery Type:  Vaginal Childbirth: What To Expect At Home    Your Recovery: Your body will slowly heal in the next few weeks. It is easy to get too tired and overwhelmed during the first weeks after your baby is born. Changes in your hormones can shift your mood without warning. You may find it hard to meet the extra demands on your energy and time. Take it easy on yourself. Follow-up care is a key part of your treatment and safety. Be sure to make and go to all appointments, and call your doctor if you are having problems. It's also a good idea to know your test results and keep a list of the medicines you take. How can you care for yourself at home? Vaginal bleeding and cramps  · After delivery, you will have a bloody discharge from the vagina. This will turn pink within a week and then white or yellow after about 10 days. It may last for 2 to 4 weeks or longer, until the uterus has healed. Use pads instead of tampons until you stop bleeding. · Do not worry if you pass some blood clots, as long as they are smaller than a golf ball.  If you have a tear or stitches in your vaginal area, change the pad at least every 4 hours to prevent soreness and infection. · You may have cramps for the first few days after childbirth. These are normal and occur as the uterus shrinks to normal size. Take an over-the-counter pain medicine, such as acetaminophen (Tylenol), ibuprofen (Advil, Motrin), or naproxen (Aleve), for cramps. Read and follow all instructions on the label. Do not take aspirin, because it can cause more bleeding. Do not take acetaminophen (Tylenol) and other acetaminophen containing medications (i.e. Percocet) at the same time. Breast fullness  · Your breasts may overfill (engorge) in the first few days after delivery. To help milk flow and to relieve pain, warm your breasts in the shower or by using warm, moist towels before nursing. · If you are not nursing, do not put warmth on your breasts or touch your breasts. Wear a tight bra or sports bra and use ice until the fullness goes away. This usually takes 2 to 3 days. · Put ice or a cold pack on your breast after nursing to reduce swelling and pain. Put a thin cloth between the ice and your skin. Activity  · Eat a balanced diet. Do not try to lose weight by cutting calories. Keep taking your prenatal vitamins, or take a multivitamin. · Get as much rest as you can. Try to take naps when your baby sleeps during the day. · Get some exercise every day. But do not do any heavy exercise until your doctor says it is okay. · Wait until you are healed (about 4 to 6 weeks) before you have sexual intercourse. Your doctor will tell you when it is okay to have sex. · Talk to your doctor about birth control. You can get pregnant even before your period returns. Also, you can get pregnant while you are breast-feeding. Mental Health  · Many women get the \"baby blues\" during the first few days after childbirth. You may lose sleep, feel irritable, and cry easily. You may feel happy one minute and sad the next. Hormone changes are one cause of these emotional changes.  Also, the demands of a new baby, along with visits from relatives or other family needs, add to a mother's stress. The \"baby blues\" often peak around the fourth day. Then they ease up in less than 2 weeks. · If your moodiness or anxiety lasts for more than 2 weeks, or if you feel like life is not worth living, you may have postpartum depression. This is different for each mother. Some mothers with serious depression may worry intensely about their infant's well-being. Others may feel distant from their child. Some mothers might even feel that they might harm their baby. A mother may have signs of paranoia, wondering if someone is watching her. · With all the changes in your life, you may not know if you are depressed. Pregnancy sometimes causes changes in how you feel that are similar to the symptoms of depression. · Symptoms of depression include:  · Feeling sad or hopeless and losing interest in daily activities. These are the most common symptoms of depression. · Sleeping too much or not enough. · Feeling tired. You may feel as if you have no energy. · Eating too much or too little. · POSTPARTUM SUPPORT INTERNATIONAL (PSI) offers a Warm line; Chat with the Expert phone sessions; Information and Articles about Pregnancy and Postpartum Mood Disorders; Comprehensive List of Free Support Groups; Knowledgeable local coordinators who will offer support, information, and resources; Guide to Resources on Ping4; Calendar of events in the  mood disorders community; Latest News and Research; and [facility] [address] for [employer]. Remember - You are not alone; You are not to blame; With help, you will be well. 8-065-650-PPD(3275). WWW. POSTPARTUM. NET   · Writing or talking about death, such as writing suicide notes or talking about guns, knives, or pills. Keep the numbers for these national suicide hotlines: 6-174-101-TALK (2-355.155.8596) and 6-155-FHQVCQY (6-890.346.5017).  If you or someone you know talks about suicide or feeling hopeless, get help right away. Constipation and Hemorrhoids  · Drink plenty of fluids, enough so that your urine is light yellow or clear like water. If you have kidney, heart, or liver disease and have to limit fluids, talk with your doctor before you increase the amount of fluids you drink. · Eat plenty of fiber each day. Have a bran muffin or bran cereal for breakfast, and try eating a piece of fruit for a mid-afternoon snack. · For painful, itchy hemorrhoids, put ice or a cold pack on the area several times a day for 10 minutes at a time. Follow this by putting a warm compress on the area for another 10 to 20 minutes or by sitting in a shallow, warm bath. When should you call for help? Call 911 anytime you think you may need emergency care. For example, call if:  · You are thinking of hurting yourself, your baby, or anyone else. · You passed out (lost consciousness). · You have symptoms of a blood clot in your lung (called a pulmonary embolism). These may include:    · Sudden chest pain. · Trouble breathing. · Coughing up blood. Call your doctor now or seek immediate medical care if:  · You have severe vaginal bleeding. · You are soaking through a pad each hour for 2 or more hours. · Your vaginal bleeding seems to be getting heavier or is still bright red 4 days after delivery. · You are dizzy or lightheaded, or you feel like you may faint. · You are vomiting or cannot keep fluids down. · You have a fever. · You have new or more belly pain. · You pass tissue (not just blood). · Your vaginal discharge smells bad. · Your belly feels tender or full and hard. · Your breasts are continuously painful or red. · You feel sad, anxious, or hopeless for more than a few days. · You have sudden, severe pain in your belly.   · You have symptoms of a blood clot in your leg (called a deep vein thrombosis),          such as:  · Pain in your calf, back of the knee, thigh, or groin. · Redness and swelling in your leg or groin. · You have symptoms of preeclampsia, such as:  · Sudden swelling of your face, hands, or feet. · New vision problems (such as dimness or blurring). · A severe headache. · Your blood pressure is higher than it should be or rises suddenly. · You have new nausea or vomiting. Watch closely for changes in your health, and be sure to contact your doctor if you have any problems. Additional Information:  Postpartum Support    PARENTS:  Are you feeling sad or depressed? Is it difficult for you to enjoy yourself? Do you feel more irritable or tense? Do you feel anxious or panicky? Are you having difficulty bonding with your baby? Do you feel as if you are \"out of control\" or \"going crazy\"? Are you worried that you might hurt your baby or yourself? FAMILIES: Do you worry that something is wrong but don't know how to help? Do you think that your partner or spouse is having problems coping? Are you worried that it may never get better? While many women experience some mild mood change or \"the blues\" during or after the birth of a child, 1 in 9 women experience more significant symptoms of depression or anxiety. 1 in 10 Dads become depressed during the first year. Things you can do  Being a good parent includes taking care of yourself. If you take care of yourself, you will be able to take better care of your baby and your family. · Talk to a counselor or healthcare provider who has training in  mood and anxiety problems. · Learn as much as you can about pregnancy and postpartum depression and anxiety. · Get support from family and friends. Ask for help when you need it. · Join a support group in your area or online. · Keep active by walking, stretching or whatever form of exercise helps you to feel better. · Get enough rest and time for yourself. · Eat a healthy diet. · Don't give up!  It may take more than one try to get the right help you need. These are general instructions for a healthy lifestyle:    No smoking/ No tobacco products/ Avoid exposure to second hand smoke    Surgeon General's Warning:  Quitting smoking now greatly reduces serious risk to your health. Obesity, smoking, and sedentary lifestyle greatly increases your risk for illness    A healthy diet, regular physical exercise & weight monitoring are important for maintaining a healthy lifestyle    Recognize signs and symptoms of STROKE:    F-face looks uneven    A-arms unable to move or move unevenly    S-speech slurred or non-existent    T-time-call 911 as soon as signs and symptoms begin - DO NOT go       back to bed or wait to see if you get better - TIME IS BRAIN. I have had the opportunity to make my options or choices for discharge. I have received and understand these instructions.

## 2019-02-19 NOTE — LACTATION NOTE
This note was copied from a baby's chart. Per mom: Baby nursing well and has improved throughout post partum stay, milk is in transition, baby feeding vigorously with rhythmic suck, swallow, breathe pattern, with audible swallowing, and evident milk transfer, both breasts offerd, baby is asleep following feeding. Baby is feeding on demand, voiding and stools present as appropriate over the last 24 hours. Mom states infant 'bites\" and latch hurts at times. Assisted mom to obtain deep latch in prone position. Characteristics deep latch discussed. Demonstrated pillow supports with return demo by family. Demonstrated hand expression with return demo by mom. Multiple drops of milk expressed. Mom will use EBM and lanolin to intact nipples and work on deeper latches. Breastfeeding Support Group [facility] Nursing Mothers Group meets the 1st and 3rd Tuesday of each month in the [facility] Department from 10:00-11:00, (located behind [employer] on the first floor) Meetings are facilitated by board certified lactation consultants and all breastfeeding moms and their infants are invited. All lactation teaching completed and family verbalizing confidence with feeding. They deny c/o or questions.

## 2019-02-19 NOTE — DISCHARGE SUMMARY
Obstetrical Discharge Summary     Name: Wai Worley MRN: [id number]  SSN: [id number]    YOB: 1980  Age: 41 y.o. Sex: female      Admit Date: 2019    Discharge Date: 2019     Admitting Physician: Gray Oates CNM     Attending Physician:  Cally Anaya MD     Admission Diagnoses: Pregnancy [Z34.90]    Discharge Diagnoses:   Information for the patient's :  Shanice Peguero [[id number]]   Delivery of a 3.565 kg male infant via Vaginal, Spontaneous on 2019 at 11:42 PM  by . Apgars were 8 and 9. Additional Diagnoses:   Hospital Problems  Never Reviewed          Codes Class Noted POA    Other specified hypothyroidism ICD-10-CM: E03.8  ICD-9-CM: 244.8  2019 Unknown        Pregnancy ICD-10-CM: Z34.90  ICD-9-CM: V22.2  2019 Unknown             Lab Results   Component Value Date/Time    Rubella, External immune 2018    GrBStrep, External negative 2019       Hospital Course: Normal hospital course following the delivery. Patient Instructions:   Current Discharge Medication List      START taking these medications    Details   ibuprofen (MOTRIN) 600 mg tablet Take 1 Tab by mouth every six (6) hours as needed for Pain. Qty: 30 Tab, Refills: 1      oxyCODONE-acetaminophen (PERCOCET) 5-325 mg per tablet Take 1 Tab by mouth every six (6) hours as needed for Pain. Max Daily Amount: 4 Tabs. Qty: 12 Tab, Refills: 0    Associated Diagnoses: Vaginal delivery      iron, carbonyl (FEOSOL) 45 mg tab Take 1 Tab by mouth daily. Qty: 30 Tab, Refills: 3         CONTINUE these medications which have CHANGED    Details   levothyroxine (SYNTHROID) 150 mcg tablet Take 1 Tab by mouth every morning. Qty: 45 Tab, Refills: 0         CONTINUE these medications which have NOT CHANGED    Details   PNV66-Iron Fumarate-FA-DSS-DHA 26-1.2- mg cap Take  by mouth.              Disposition at Discharge: Home or self care    Condition at Discharge: Stable    Reference my discharge instructions.     Follow-up Appointments   Procedures    FOLLOW UP VISIT Appointment in: 6 Weeks     Standing Status:   Standing     Number of Occurrences:   1     Order Specific Question:   Appointment in     Answer:   6 Weeks        Signed By:  Beka Montenegro MD     February 19, 2019

## 2019-02-19 NOTE — PROGRESS NOTES
Post-Partum Day Number 2 Progress Note Chandni Atkins Assessment: Doing well, post partum day 2 Plan: 1. Discharge home today 2. Follow up in office in 6 weeks with Sobeida Cruz MD 
3. Post partum activity advised, diet as tolerated 4. Discharge Medications: ibuprofen, percocet and medications prior to admission 5. Fe on discharge. 6. Continue synthroid at current dose, will recheck TSH at 6 weeks. Information for the patient's :  Cheryl Cleveland [[id number]] Vaginal, Spontaneous Patient doing well without significant complaint. Voiding without difficulty, normal lochia. Vitals: 
Visit Vitals BP 98/61 (BP 1 Location: Left arm, BP Patient Position: At rest) Pulse 67 Temp 98.2 °F (36.8 °C) Resp 16 Ht 5' 2\" (1.575 m) Wt 66.2 kg (146 lb) SpO2 100% Breastfeeding? Unknown BMI 26.70 kg/m² Temp (24hrs), Av.2 °F (36.8 °C), Min:97.8 °F (36.6 °C), Max:98.5 °F (36.9 °C) Exam:    
    Patient without distress. Abdomen soft, fundus firm, nontender Lower extremities are negative for swelling, cords or tenderness. Labs:  
 
Lab Results Component Value Date/Time WBC 11.4 (H) 2019 10:26 AM  
 HGB 8.7 (L) 2019 10:26 AM  
 HCT 28.4 (L) 2019 10:26 AM  
 PLATELET 826  10:26 AM  
 
 
No results found for this or any previous visit (from the past 24 hour(s)).

## 2019-02-19 NOTE — ROUTINE PROCESS
Bedside shift change report given to MITCHELL Wang RN (oncoming nurse) by MITCHELL Martinez RN (offgoing nurse). Report included the following information SBAR.

## 2022-03-19 PROBLEM — Z34.90 PREGNANCY: Status: ACTIVE | Noted: 2019-02-17

## 2022-03-19 PROBLEM — E03.8 OTHER SPECIFIED HYPOTHYROIDISM: Status: ACTIVE | Noted: 2019-02-19

## 2024-05-30 ENCOUNTER — OFFICE VISIT (OUTPATIENT)
Facility: CLINIC | Age: 44
End: 2024-05-30
Payer: COMMERCIAL

## 2024-05-30 VITALS
OXYGEN SATURATION: 93 % | HEIGHT: 62 IN | DIASTOLIC BLOOD PRESSURE: 80 MMHG | WEIGHT: 114.5 LBS | TEMPERATURE: 98.4 F | HEART RATE: 62 BPM | RESPIRATION RATE: 17 BRPM | BODY MASS INDEX: 21.07 KG/M2 | SYSTOLIC BLOOD PRESSURE: 119 MMHG

## 2024-05-30 DIAGNOSIS — Z83.3 FAMILY HISTORY OF DIABETES MELLITUS (DM): ICD-10-CM

## 2024-05-30 DIAGNOSIS — Z86.2 HX OF IRON DEFICIENCY ANEMIA: ICD-10-CM

## 2024-05-30 DIAGNOSIS — Z11.59 NEED FOR HEPATITIS C SCREENING TEST: ICD-10-CM

## 2024-05-30 DIAGNOSIS — Z00.00 ADULT GENERAL MEDICAL EXAM: Primary | ICD-10-CM

## 2024-05-30 PROBLEM — E89.0 HYPOTHYROIDISM FOLLOWING RADIOIODINE THERAPY: Status: ACTIVE | Noted: 2019-02-19

## 2024-05-30 PROBLEM — C50.919 FAMILIAL CANCER OF BREAST (HCC): Status: ACTIVE | Noted: 2024-05-30

## 2024-05-30 PROBLEM — Z34.90 PREGNANCY: Status: RESOLVED | Noted: 2019-02-17 | Resolved: 2024-05-30

## 2024-05-30 PROBLEM — D64.9 ANEMIA: Status: RESOLVED | Noted: 2024-05-30 | Resolved: 2024-05-30

## 2024-05-30 PROBLEM — D58.2 HEMOGLOBIN C TRAIT (HCC): Status: ACTIVE | Noted: 2024-05-30

## 2024-05-30 PROBLEM — D64.9 ANEMIA: Status: ACTIVE | Noted: 2024-05-30

## 2024-05-30 PROCEDURE — 99386 PREV VISIT NEW AGE 40-64: CPT | Performed by: FAMILY MEDICINE

## 2024-05-30 RX ORDER — LEVOTHYROXINE SODIUM 112 UG/1
112 TABLET ORAL DAILY
COMMUNITY

## 2024-05-30 SDOH — ECONOMIC STABILITY: FOOD INSECURITY: WITHIN THE PAST 12 MONTHS, YOU WORRIED THAT YOUR FOOD WOULD RUN OUT BEFORE YOU GOT MONEY TO BUY MORE.: NEVER TRUE

## 2024-05-30 SDOH — ECONOMIC STABILITY: FOOD INSECURITY: WITHIN THE PAST 12 MONTHS, THE FOOD YOU BOUGHT JUST DIDN'T LAST AND YOU DIDN'T HAVE MONEY TO GET MORE.: NEVER TRUE

## 2024-05-30 SDOH — ECONOMIC STABILITY: HOUSING INSECURITY
IN THE LAST 12 MONTHS, WAS THERE A TIME WHEN YOU DID NOT HAVE A STEADY PLACE TO SLEEP OR SLEPT IN A SHELTER (INCLUDING NOW)?: NO

## 2024-05-30 SDOH — ECONOMIC STABILITY: INCOME INSECURITY: HOW HARD IS IT FOR YOU TO PAY FOR THE VERY BASICS LIKE FOOD, HOUSING, MEDICAL CARE, AND HEATING?: NOT HARD AT ALL

## 2024-05-30 ASSESSMENT — PATIENT HEALTH QUESTIONNAIRE - PHQ9
SUM OF ALL RESPONSES TO PHQ QUESTIONS 1-9: 0
1. LITTLE INTEREST OR PLEASURE IN DOING THINGS: NOT AT ALL
SUM OF ALL RESPONSES TO PHQ QUESTIONS 1-9: 0
2. FEELING DOWN, DEPRESSED OR HOPELESS: NOT AT ALL
SUM OF ALL RESPONSES TO PHQ9 QUESTIONS 1 & 2: 0
SUM OF ALL RESPONSES TO PHQ QUESTIONS 1-9: 0
SUM OF ALL RESPONSES TO PHQ QUESTIONS 1-9: 0

## 2024-05-30 NOTE — PROGRESS NOTES
Chief Complaint   Patient presents with    Establish Care     she is a 43 y.o. year old female who presents for CPE  Complete Physical Exam Questions:    LMP -  5/26/24  Last Pap (q 3 years, or q5 with HPV) - 6/23  Last Mammogram (50-74 biennially)- 2023  Hx of abnl Pap - back in late teens/early 20s, none more recently    1.  Do you follow a low fat diet?  yes  2.  Are you up to date on your Tdap (<10 years)?  Yes  3.  Have you ever had a Pneumovax vaccine (>65)?  Not applicable   PCV13 Not applicable   PPSV23 Not applicable  4.  Have you had Zoster vaccine (>60)? Not applicable  5.  Have you had the HPV - Gardasil (13- 26)? Not applicable  6.  Do you follow an exercise program?  No  7.  Do you smoke?  No  8.  Do you consider yourself overweight?  No  9.  Is there a family history of CAD< age 50? No  10.  Is there a family history of Cancer?  Yes  11.  Do you know your Cancer risks?  Yes  12.  Have you had a colonoscopy?  Not applicable  13. Have you been tested for HIV or other STI's? Yes HIV testing today(18-64 y/o)?No  14.  Have had a bone density scan or DEXA done(>65)?Not applicable  15.  Have you had an EKG performed in the last five years (>50)?  No    Other complaints: seeing Endo, hx of Graves disease.  Follows with Gyn as well.  Would like regular blood work today.    Reviewed and agree with Nurse Note and duplicated in this note.  Reviewed PmHx, RxHx, FmHx, SocHx, AllgHx and updated and dated in the chart.    Family History   Problem Relation Age of Onset    Diabetes Father     Breast Cancer Maternal Aunt        Past Medical History:   Diagnosis Date    Anemia 05/30/2024    on fe    Hyperthyroidism       Social History     Socioeconomic History    Marital status: Single     Spouse name: None    Number of children: None    Years of education: None    Highest education level: None   Tobacco Use    Smoking status: Never    Smokeless tobacco: Never   Vaping Use    Vaping Use: Never used   Substance and

## 2024-05-31 LAB
ALBUMIN SERPL-MCNC: 4.6 G/DL (ref 3.9–4.9)
ALBUMIN/GLOB SERPL: 1.6 {RATIO} (ref 1.2–2.2)
ALP SERPL-CCNC: 65 IU/L (ref 44–121)
ALT SERPL-CCNC: 21 IU/L (ref 0–32)
AST SERPL-CCNC: 27 IU/L (ref 0–40)
BASOPHILS # BLD AUTO: 0.1 X10E3/UL (ref 0–0.2)
BASOPHILS NFR BLD AUTO: 2 %
BILIRUB SERPL-MCNC: 0.3 MG/DL (ref 0–1.2)
BUN SERPL-MCNC: 11 MG/DL (ref 6–24)
BUN/CREAT SERPL: 11 (ref 9–23)
CALCIUM SERPL-MCNC: 9.6 MG/DL (ref 8.7–10.2)
CHLORIDE SERPL-SCNC: 103 MMOL/L (ref 96–106)
CHOLEST SERPL-MCNC: 335 MG/DL (ref 100–199)
CO2 SERPL-SCNC: 21 MMOL/L (ref 20–29)
CREAT SERPL-MCNC: 0.97 MG/DL (ref 0.57–1)
EGFRCR SERPLBLD CKD-EPI 2021: 75 ML/MIN/1.73
EOSINOPHIL # BLD AUTO: 0.1 X10E3/UL (ref 0–0.4)
EOSINOPHIL NFR BLD AUTO: 2 %
ERYTHROCYTE [DISTWIDTH] IN BLOOD BY AUTOMATED COUNT: 15.8 % (ref 11.7–15.4)
GLOBULIN SER CALC-MCNC: 2.8 G/DL (ref 1.5–4.5)
GLUCOSE SERPL-MCNC: 78 MG/DL (ref 70–99)
HBA1C MFR BLD: 5.5 % (ref 4.8–5.6)
HCT VFR BLD AUTO: 35.1 % (ref 34–46.6)
HCV IGG SERPL QL IA: NON REACTIVE
HDLC SERPL-MCNC: 140 MG/DL
HGB BLD-MCNC: 11.2 G/DL (ref 11.1–15.9)
IMM GRANULOCYTES # BLD AUTO: 0 X10E3/UL (ref 0–0.1)
IMM GRANULOCYTES NFR BLD AUTO: 0 %
IRON SATN MFR SERPL: 6 % (ref 15–55)
IRON SERPL-MCNC: 26 UG/DL (ref 27–159)
LABORATORY COMMENT REPORT: ABNORMAL
LDLC SERPL CALC-MCNC: 190 MG/DL (ref 0–99)
LYMPHOCYTES # BLD AUTO: 1.8 X10E3/UL (ref 0.7–3.1)
LYMPHOCYTES NFR BLD AUTO: 33 %
MCH RBC QN AUTO: 25 PG (ref 26.6–33)
MCHC RBC AUTO-ENTMCNC: 31.9 G/DL (ref 31.5–35.7)
MCV RBC AUTO: 78 FL (ref 79–97)
MONOCYTES # BLD AUTO: 0.5 X10E3/UL (ref 0.1–0.9)
MONOCYTES NFR BLD AUTO: 9 %
NEUTROPHILS # BLD AUTO: 3 X10E3/UL (ref 1.4–7)
NEUTROPHILS NFR BLD AUTO: 54 %
PLATELET # BLD AUTO: 325 X10E3/UL (ref 150–450)
POTASSIUM SERPL-SCNC: 4.7 MMOL/L (ref 3.5–5.2)
PROT SERPL-MCNC: 7.4 G/DL (ref 6–8.5)
RBC # BLD AUTO: 4.48 X10E6/UL (ref 3.77–5.28)
SODIUM SERPL-SCNC: 137 MMOL/L (ref 134–144)
TIBC SERPL-MCNC: 450 UG/DL (ref 250–450)
TRIGL SERPL-MCNC: 48 MG/DL (ref 0–149)
UIBC SERPL-MCNC: 424 UG/DL (ref 131–425)
VLDLC SERPL CALC-MCNC: 5 MG/DL (ref 5–40)
WBC # BLD AUTO: 5.4 X10E3/UL (ref 3.4–10.8)

## 2024-06-25 ENCOUNTER — TELEPHONE (OUTPATIENT)
Facility: CLINIC | Age: 44
End: 2024-06-25

## 2024-06-25 NOTE — TELEPHONE ENCOUNTER
You sent patient a letter with lab results stating elevated cholesterol and that she should start a medication, please send in the rx

## 2024-06-26 RX ORDER — ATORVASTATIN CALCIUM 40 MG/1
40 TABLET, FILM COATED ORAL DAILY
Qty: 30 TABLET | Refills: 3 | Status: SHIPPED | OUTPATIENT
Start: 2024-06-26

## 2024-07-03 LAB — MAMMOGRAPHY, EXTERNAL: NORMAL

## 2024-09-18 ENCOUNTER — OFFICE VISIT (OUTPATIENT)
Facility: CLINIC | Age: 44
End: 2024-09-18
Payer: COMMERCIAL

## 2024-09-18 VITALS
SYSTOLIC BLOOD PRESSURE: 113 MMHG | RESPIRATION RATE: 18 BRPM | OXYGEN SATURATION: 98 % | HEART RATE: 63 BPM | DIASTOLIC BLOOD PRESSURE: 74 MMHG | TEMPERATURE: 98.2 F | WEIGHT: 114.9 LBS | HEIGHT: 62 IN | BODY MASS INDEX: 21.14 KG/M2

## 2024-09-18 DIAGNOSIS — E78.2 MIXED HYPERLIPIDEMIA: Primary | ICD-10-CM

## 2024-09-18 DIAGNOSIS — E61.1 IRON DEFICIENCY: ICD-10-CM

## 2024-09-18 DIAGNOSIS — E89.0 HYPOTHYROIDISM FOLLOWING RADIOIODINE THERAPY: ICD-10-CM

## 2024-09-18 PROCEDURE — 99213 OFFICE O/P EST LOW 20 MIN: CPT | Performed by: FAMILY MEDICINE

## 2024-09-19 LAB
ALBUMIN SERPL-MCNC: 4.4 G/DL (ref 3.9–4.9)
ALP SERPL-CCNC: 61 IU/L (ref 44–121)
ALT SERPL-CCNC: 42 IU/L (ref 0–32)
AST SERPL-CCNC: 36 IU/L (ref 0–40)
BASOPHILS # BLD AUTO: 0.1 X10E3/UL (ref 0–0.2)
BASOPHILS NFR BLD AUTO: 1 %
BILIRUB SERPL-MCNC: 0.5 MG/DL (ref 0–1.2)
BUN SERPL-MCNC: 7 MG/DL (ref 6–24)
BUN/CREAT SERPL: 9 (ref 9–23)
CALCIUM SERPL-MCNC: 9.1 MG/DL (ref 8.7–10.2)
CHLORIDE SERPL-SCNC: 103 MMOL/L (ref 96–106)
CHOLEST SERPL-MCNC: 165 MG/DL (ref 100–199)
CO2 SERPL-SCNC: 22 MMOL/L (ref 20–29)
CREAT SERPL-MCNC: 0.81 MG/DL (ref 0.57–1)
EGFRCR SERPLBLD CKD-EPI 2021: 93 ML/MIN/1.73
EOSINOPHIL # BLD AUTO: 0 X10E3/UL (ref 0–0.4)
EOSINOPHIL NFR BLD AUTO: 1 %
ERYTHROCYTE [DISTWIDTH] IN BLOOD BY AUTOMATED COUNT: 17.2 % (ref 11.7–15.4)
GLOBULIN SER CALC-MCNC: 2.3 G/DL (ref 1.5–4.5)
GLUCOSE SERPL-MCNC: 151 MG/DL (ref 70–99)
HCT VFR BLD AUTO: 36.1 % (ref 34–46.6)
HDLC SERPL-MCNC: 112 MG/DL
HGB BLD-MCNC: 11.7 G/DL (ref 11.1–15.9)
IMM GRANULOCYTES # BLD AUTO: 0 X10E3/UL (ref 0–0.1)
IMM GRANULOCYTES NFR BLD AUTO: 0 %
IRON SATN MFR SERPL: 15 % (ref 15–55)
IRON SERPL-MCNC: 52 UG/DL (ref 27–159)
LDLC SERPL CALC-MCNC: 45 MG/DL (ref 0–99)
LYMPHOCYTES # BLD AUTO: 2.1 X10E3/UL (ref 0.7–3.1)
LYMPHOCYTES NFR BLD AUTO: 25 %
MCH RBC QN AUTO: 28.2 PG (ref 26.6–33)
MCHC RBC AUTO-ENTMCNC: 32.4 G/DL (ref 31.5–35.7)
MCV RBC AUTO: 87 FL (ref 79–97)
MONOCYTES # BLD AUTO: 0.6 X10E3/UL (ref 0.1–0.9)
MONOCYTES NFR BLD AUTO: 7 %
NEUTROPHILS # BLD AUTO: 5.5 X10E3/UL (ref 1.4–7)
NEUTROPHILS NFR BLD AUTO: 66 %
PLATELET # BLD AUTO: 269 X10E3/UL (ref 150–450)
POTASSIUM SERPL-SCNC: 3.4 MMOL/L (ref 3.5–5.2)
PROT SERPL-MCNC: 6.7 G/DL (ref 6–8.5)
RBC # BLD AUTO: 4.15 X10E6/UL (ref 3.77–5.28)
SODIUM SERPL-SCNC: 138 MMOL/L (ref 134–144)
T3 SERPL-MCNC: 38 NG/DL (ref 71–180)
T3FREE SERPL-MCNC: 1 PG/ML (ref 2–4.4)
T4 FREE SERPL-MCNC: 0.26 NG/DL (ref 0.82–1.77)
TIBC SERPL-MCNC: 345 UG/DL (ref 250–450)
TRIGL SERPL-MCNC: 30 MG/DL (ref 0–149)
TSH SERPL DL<=0.005 MIU/L-ACNC: 53.6 UIU/ML (ref 0.45–4.5)
UIBC SERPL-MCNC: 293 UG/DL (ref 131–425)
VLDLC SERPL CALC-MCNC: 8 MG/DL (ref 5–40)
WBC # BLD AUTO: 8.3 X10E3/UL (ref 3.4–10.8)

## 2024-11-25 RX ORDER — ATORVASTATIN CALCIUM 40 MG/1
40 TABLET, FILM COATED ORAL DAILY
Qty: 30 TABLET | Refills: 3 | Status: SHIPPED | OUTPATIENT
Start: 2024-11-25

## 2024-12-16 RX ORDER — ATORVASTATIN CALCIUM 40 MG/1
40 TABLET, FILM COATED ORAL DAILY
Qty: 30 TABLET | Refills: 3 | Status: SHIPPED | OUTPATIENT
Start: 2024-12-16

## 2025-04-18 ENCOUNTER — OFFICE VISIT (OUTPATIENT)
Facility: CLINIC | Age: 45
End: 2025-04-18
Payer: COMMERCIAL

## 2025-04-18 VITALS
HEIGHT: 62 IN | TEMPERATURE: 97.6 F | SYSTOLIC BLOOD PRESSURE: 111 MMHG | BODY MASS INDEX: 21.27 KG/M2 | DIASTOLIC BLOOD PRESSURE: 72 MMHG | RESPIRATION RATE: 16 BRPM | WEIGHT: 115.6 LBS | OXYGEN SATURATION: 95 % | HEART RATE: 80 BPM

## 2025-04-18 DIAGNOSIS — E61.1 IRON DEFICIENCY: ICD-10-CM

## 2025-04-18 DIAGNOSIS — E78.2 MIXED HYPERLIPIDEMIA: Primary | ICD-10-CM

## 2025-04-18 DIAGNOSIS — E78.2 MIXED HYPERLIPIDEMIA: ICD-10-CM

## 2025-04-18 DIAGNOSIS — E89.0 HYPOTHYROIDISM FOLLOWING RADIOIODINE THERAPY: ICD-10-CM

## 2025-04-18 DIAGNOSIS — M79.604 RIGHT LEG PAIN: ICD-10-CM

## 2025-04-18 PROBLEM — Z80.3 FAMILY HISTORY OF BREAST CANCER: Status: ACTIVE | Noted: 2024-05-30

## 2025-04-18 PROCEDURE — 99214 OFFICE O/P EST MOD 30 MIN: CPT | Performed by: FAMILY MEDICINE

## 2025-04-18 RX ORDER — LEVOTHYROXINE SODIUM 125 UG/1
125 TABLET ORAL DAILY
COMMUNITY
Start: 2025-04-15

## 2025-04-18 SDOH — ECONOMIC STABILITY: FOOD INSECURITY: WITHIN THE PAST 12 MONTHS, THE FOOD YOU BOUGHT JUST DIDN'T LAST AND YOU DIDN'T HAVE MONEY TO GET MORE.: NEVER TRUE

## 2025-04-18 SDOH — ECONOMIC STABILITY: FOOD INSECURITY: WITHIN THE PAST 12 MONTHS, YOU WORRIED THAT YOUR FOOD WOULD RUN OUT BEFORE YOU GOT MONEY TO BUY MORE.: NEVER TRUE

## 2025-04-18 ASSESSMENT — PATIENT HEALTH QUESTIONNAIRE - PHQ9
SUM OF ALL RESPONSES TO PHQ QUESTIONS 1-9: 0
1. LITTLE INTEREST OR PLEASURE IN DOING THINGS: NOT AT ALL
SUM OF ALL RESPONSES TO PHQ QUESTIONS 1-9: 0
2. FEELING DOWN, DEPRESSED OR HOPELESS: NOT AT ALL
SUM OF ALL RESPONSES TO PHQ QUESTIONS 1-9: 0
SUM OF ALL RESPONSES TO PHQ QUESTIONS 1-9: 0

## 2025-04-18 NOTE — PROGRESS NOTES
Chief Complaint   Patient presents with    6 Month Follow-Up    Leg Pain     Pt states that she has been experiencing some right leg pain      \"Have you been to the ER, urgent care clinic since your last visit?  Hospitalized since your last visit?\"    NO    “Have you seen or consulted any other health care providers outside our system since your last visit?”    NO

## 2025-04-18 NOTE — PROGRESS NOTES
Silas Mackenzie (:  1980) is a 44 y.o. female, Established patient, here for evaluation of the following chief complaint(s):  6 Month Follow-Up and Leg Pain (Pt states that she has been experiencing some right leg pain )      Subjective   History of Present Illness  The patient is a 44-year-old female who presents for follow-up.    She reports experiencing hair loss, a symptom she associates with her initial thyroid diagnosis. Additionally, occasional chest discomfort and palpitations are suspected to be related to her thyroid condition. A daily regimen of thyroid medication has been maintained, with the dosage recently increased to 125 mcg due to persistently low levels.    Atorvastatin therapy continues, although not consistently at the same time each day. Iron supplements are taken, but not on a daily basis. A recurrence of pica, specifically ice chewing, is noted, a behavior previously ceased while on a consistent iron regimen.    Currently under the care of a gynecologist, she is due for her annual examination. A mammogram performed in 2024 yielded normal results. Bowel movements have improved, with no constipation reported.    Intermittent leg pain is experienced, reminiscent of discomfort felt during pregnancy when the fetus was positioned against the pelvis. This pain, described as a sensation rather than acute pain, occasionally causes limping. Uncertainty exists regarding the pain's relation to sitting position at work or when lying down. The pain is not believed to be sciatic in nature, with no recent falls or injuries reported. The pain is not constant and was absent during the current week.    Frequent foot cramps are reported, particularly after wearing high-heeled shoes.    Review of Systems   As per HPI    Objective   Blood pressure 111/72, pulse 80, temperature 97.6 °F (36.4 °C), temperature source Oral, resp. rate 16, height 1.575 m (5' 2\"), weight 52.4 kg (115 lb 9.6 oz), SpO2

## 2025-04-19 LAB
ALBUMIN SERPL-MCNC: 4.1 G/DL (ref 3.9–4.9)
ALP SERPL-CCNC: 67 IU/L (ref 44–121)
ALT SERPL-CCNC: 19 IU/L (ref 0–32)
AST SERPL-CCNC: 22 IU/L (ref 0–40)
BASOPHILS # BLD AUTO: 0.1 X10E3/UL (ref 0–0.2)
BASOPHILS NFR BLD AUTO: 1 %
BILIRUB SERPL-MCNC: 0.5 MG/DL (ref 0–1.2)
BUN SERPL-MCNC: 10 MG/DL (ref 6–24)
BUN/CREAT SERPL: 12 (ref 9–23)
CALCIUM SERPL-MCNC: 9.4 MG/DL (ref 8.7–10.2)
CHLORIDE SERPL-SCNC: 105 MMOL/L (ref 96–106)
CHOLEST SERPL-MCNC: 157 MG/DL (ref 100–199)
CO2 SERPL-SCNC: 25 MMOL/L (ref 20–29)
CREAT SERPL-MCNC: 0.81 MG/DL (ref 0.57–1)
EGFRCR SERPLBLD CKD-EPI 2021: 93 ML/MIN/1.73
EOSINOPHIL # BLD AUTO: 0.1 X10E3/UL (ref 0–0.4)
EOSINOPHIL NFR BLD AUTO: 3 %
ERYTHROCYTE [DISTWIDTH] IN BLOOD BY AUTOMATED COUNT: 15.1 % (ref 11.7–15.4)
GLOBULIN SER CALC-MCNC: 2.5 G/DL (ref 1.5–4.5)
GLUCOSE SERPL-MCNC: 58 MG/DL (ref 70–99)
HCT VFR BLD AUTO: 35.1 % (ref 34–46.6)
HDLC SERPL-MCNC: 95 MG/DL
HGB BLD-MCNC: 11 G/DL (ref 11.1–15.9)
IMM GRANULOCYTES # BLD AUTO: 0 X10E3/UL (ref 0–0.1)
IMM GRANULOCYTES NFR BLD AUTO: 0 %
IRON SATN MFR SERPL: 8 % (ref 15–55)
IRON SERPL-MCNC: 31 UG/DL (ref 27–159)
LDLC SERPL CALC-MCNC: 52 MG/DL (ref 0–99)
LYMPHOCYTES # BLD AUTO: 1.5 X10E3/UL (ref 0.7–3.1)
LYMPHOCYTES NFR BLD AUTO: 29 %
MCH RBC QN AUTO: 27 PG (ref 26.6–33)
MCHC RBC AUTO-ENTMCNC: 31.3 G/DL (ref 31.5–35.7)
MCV RBC AUTO: 86 FL (ref 79–97)
MONOCYTES # BLD AUTO: 0.6 X10E3/UL (ref 0.1–0.9)
MONOCYTES NFR BLD AUTO: 11 %
NEUTROPHILS # BLD AUTO: 2.9 X10E3/UL (ref 1.4–7)
NEUTROPHILS NFR BLD AUTO: 56 %
PLATELET # BLD AUTO: 335 X10E3/UL (ref 150–450)
POTASSIUM SERPL-SCNC: 4.1 MMOL/L (ref 3.5–5.2)
PROT SERPL-MCNC: 6.6 G/DL (ref 6–8.5)
RBC # BLD AUTO: 4.07 X10E6/UL (ref 3.77–5.28)
SODIUM SERPL-SCNC: 141 MMOL/L (ref 134–144)
TIBC SERPL-MCNC: 384 UG/DL (ref 250–450)
TRIGL SERPL-MCNC: 43 MG/DL (ref 0–149)
UIBC SERPL-MCNC: 353 UG/DL (ref 131–425)
VLDLC SERPL CALC-MCNC: 10 MG/DL (ref 5–40)
WBC # BLD AUTO: 5.3 X10E3/UL (ref 3.4–10.8)

## 2025-04-21 ENCOUNTER — RESULTS FOLLOW-UP (OUTPATIENT)
Facility: CLINIC | Age: 45
End: 2025-04-21